# Patient Record
Sex: FEMALE | Race: OTHER | HISPANIC OR LATINO | URBAN - METROPOLITAN AREA
[De-identification: names, ages, dates, MRNs, and addresses within clinical notes are randomized per-mention and may not be internally consistent; named-entity substitution may affect disease eponyms.]

---

## 2017-10-22 ENCOUNTER — EMERGENCY (EMERGENCY)
Facility: HOSPITAL | Age: 77
LOS: 1 days | Discharge: HOME | End: 2017-10-22
Payer: MEDICARE

## 2017-10-22 VITALS
HEART RATE: 95 BPM | DIASTOLIC BLOOD PRESSURE: 90 MMHG | RESPIRATION RATE: 20 BRPM | WEIGHT: 145.06 LBS | SYSTOLIC BLOOD PRESSURE: 137 MMHG | OXYGEN SATURATION: 100 %

## 2017-10-22 VITALS
HEART RATE: 81 BPM | SYSTOLIC BLOOD PRESSURE: 142 MMHG | RESPIRATION RATE: 18 BRPM | TEMPERATURE: 98 F | DIASTOLIC BLOOD PRESSURE: 80 MMHG | OXYGEN SATURATION: 94 %

## 2017-10-22 DIAGNOSIS — R19.7 DIARRHEA, UNSPECIFIED: ICD-10-CM

## 2017-10-22 DIAGNOSIS — J40 BRONCHITIS, NOT SPECIFIED AS ACUTE OR CHRONIC: ICD-10-CM

## 2017-10-22 LAB
ALBUMIN SERPL ELPH-MCNC: 3.2 G/DL — LOW (ref 3.5–5)
ALP SERPL-CCNC: 107 U/L — SIGNIFICANT CHANGE UP (ref 40–120)
ALT FLD-CCNC: 29 U/L DA — SIGNIFICANT CHANGE UP (ref 10–60)
ANION GAP SERPL CALC-SCNC: 4 MMOL/L — LOW (ref 5–17)
APPEARANCE UR: CLEAR — SIGNIFICANT CHANGE UP
AST SERPL-CCNC: 40 U/L — SIGNIFICANT CHANGE UP (ref 10–40)
BACTERIA # UR AUTO: NEGATIVE /HPF — SIGNIFICANT CHANGE UP
BASOPHILS # BLD AUTO: 0.1 K/UL — SIGNIFICANT CHANGE UP (ref 0–0.2)
BASOPHILS NFR BLD AUTO: 2 % — SIGNIFICANT CHANGE UP (ref 0–2)
BILIRUB SERPL-MCNC: 0.3 MG/DL — SIGNIFICANT CHANGE UP (ref 0.2–1.2)
BILIRUB UR-MCNC: NEGATIVE — SIGNIFICANT CHANGE UP
BUN SERPL-MCNC: 11 MG/DL — SIGNIFICANT CHANGE UP (ref 7–18)
CALCIUM SERPL-MCNC: 8.5 MG/DL — SIGNIFICANT CHANGE UP (ref 8.4–10.5)
CHLORIDE SERPL-SCNC: 106 MMOL/L — SIGNIFICANT CHANGE UP (ref 96–108)
CO2 SERPL-SCNC: 31 MMOL/L — SIGNIFICANT CHANGE UP (ref 22–31)
COLOR SPEC: YELLOW — SIGNIFICANT CHANGE UP
COMMENT - URINE: SIGNIFICANT CHANGE UP
CREAT SERPL-MCNC: 0.58 MG/DL — SIGNIFICANT CHANGE UP (ref 0.5–1.3)
DIFF PNL FLD: ABNORMAL
EOSINOPHIL # BLD AUTO: 0 K/UL — SIGNIFICANT CHANGE UP (ref 0–0.5)
EOSINOPHIL NFR BLD AUTO: 0.9 % — SIGNIFICANT CHANGE UP (ref 0–6)
EPI CELLS # UR: SIGNIFICANT CHANGE UP /HPF
GLUCOSE SERPL-MCNC: 101 MG/DL — HIGH (ref 70–99)
GLUCOSE UR QL: NEGATIVE — SIGNIFICANT CHANGE UP
HCT VFR BLD CALC: 48.8 % — HIGH (ref 34.5–45)
HGB BLD-MCNC: 15.8 G/DL — HIGH (ref 11.5–15.5)
KETONES UR-MCNC: NEGATIVE — SIGNIFICANT CHANGE UP
LEUKOCYTE ESTERASE UR-ACNC: NEGATIVE — SIGNIFICANT CHANGE UP
LYMPHOCYTES # BLD AUTO: 1.3 K/UL — SIGNIFICANT CHANGE UP (ref 1–3.3)
LYMPHOCYTES # BLD AUTO: 41.7 % — SIGNIFICANT CHANGE UP (ref 13–44)
MCHC RBC-ENTMCNC: 29.4 PG — SIGNIFICANT CHANGE UP (ref 27–34)
MCHC RBC-ENTMCNC: 32.5 GM/DL — SIGNIFICANT CHANGE UP (ref 32–36)
MCV RBC AUTO: 90.5 FL — SIGNIFICANT CHANGE UP (ref 80–100)
MONOCYTES # BLD AUTO: 0.2 K/UL — SIGNIFICANT CHANGE UP (ref 0–0.9)
MONOCYTES NFR BLD AUTO: 6.5 % — SIGNIFICANT CHANGE UP (ref 2–14)
NEUTROPHILS # BLD AUTO: 1.6 K/UL — LOW (ref 1.8–7.4)
NEUTROPHILS NFR BLD AUTO: 48.9 % — SIGNIFICANT CHANGE UP (ref 43–77)
NITRITE UR-MCNC: NEGATIVE — SIGNIFICANT CHANGE UP
PH UR: 6 — SIGNIFICANT CHANGE UP (ref 5–8)
PLATELET # BLD AUTO: 179 K/UL — SIGNIFICANT CHANGE UP (ref 150–400)
POTASSIUM SERPL-MCNC: 4.8 MMOL/L — SIGNIFICANT CHANGE UP (ref 3.5–5.3)
POTASSIUM SERPL-SCNC: 4.8 MMOL/L — SIGNIFICANT CHANGE UP (ref 3.5–5.3)
PROT SERPL-MCNC: 7.2 G/DL — SIGNIFICANT CHANGE UP (ref 6–8.3)
PROT UR-MCNC: NEGATIVE — SIGNIFICANT CHANGE UP
RBC # BLD: 5.39 M/UL — HIGH (ref 3.8–5.2)
RBC # FLD: 12.8 % — SIGNIFICANT CHANGE UP (ref 10.3–14.5)
RBC CASTS # UR COMP ASSIST: ABNORMAL /HPF (ref 0–2)
SODIUM SERPL-SCNC: 141 MMOL/L — SIGNIFICANT CHANGE UP (ref 135–145)
SP GR SPEC: 1.01 — SIGNIFICANT CHANGE UP (ref 1.01–1.02)
UROBILINOGEN FLD QL: NEGATIVE — SIGNIFICANT CHANGE UP
WBC # BLD: 3.2 K/UL — LOW (ref 3.8–10.5)
WBC # FLD AUTO: 3.2 K/UL — LOW (ref 3.8–10.5)
WBC UR QL: SIGNIFICANT CHANGE UP /HPF (ref 0–5)

## 2017-10-22 PROCEDURE — 71046 X-RAY EXAM CHEST 2 VIEWS: CPT

## 2017-10-22 PROCEDURE — 81001 URINALYSIS AUTO W/SCOPE: CPT

## 2017-10-22 PROCEDURE — 85027 COMPLETE CBC AUTOMATED: CPT

## 2017-10-22 PROCEDURE — 99283 EMERGENCY DEPT VISIT LOW MDM: CPT | Mod: 25

## 2017-10-22 PROCEDURE — 99284 EMERGENCY DEPT VISIT MOD MDM: CPT

## 2017-10-22 PROCEDURE — 36000 PLACE NEEDLE IN VEIN: CPT

## 2017-10-22 PROCEDURE — 71020: CPT | Mod: 26

## 2017-10-22 PROCEDURE — 80053 COMPREHEN METABOLIC PANEL: CPT

## 2017-10-22 RX ORDER — ACETAMINOPHEN 500 MG
650 TABLET ORAL ONCE
Qty: 0 | Refills: 0 | Status: COMPLETED | OUTPATIENT
Start: 2017-10-22 | End: 2017-10-22

## 2017-10-22 RX ORDER — IBUPROFEN 200 MG
400 TABLET ORAL ONCE
Qty: 0 | Refills: 0 | Status: COMPLETED | OUTPATIENT
Start: 2017-10-22 | End: 2017-10-22

## 2017-10-22 RX ADMIN — Medication 650 MILLIGRAM(S): at 17:16

## 2017-10-22 RX ADMIN — Medication 400 MILLIGRAM(S): at 17:16

## 2017-10-22 RX ADMIN — Medication 400 MILLIGRAM(S): at 16:46

## 2017-10-22 RX ADMIN — Medication 650 MILLIGRAM(S): at 16:46

## 2017-10-22 NOTE — ED PROVIDER NOTE - OBJECTIVE STATEMENT
78 y/o F pt with PMHx of bronchitis and gastritis presents to ED c/o subjective fever, chills, body aches, cough (nonproductive), and diarrhea x 3 days. Pt denies any recent travel, sick contacts, nausea, vomiting, abd pain, or any other complaints. NKDA.

## 2021-06-17 ENCOUNTER — EMERGENCY (EMERGENCY)
Facility: HOSPITAL | Age: 81
LOS: 1 days | Discharge: HOME | End: 2021-06-17
Attending: EMERGENCY MEDICINE
Payer: COMMERCIAL

## 2021-06-17 VITALS
DIASTOLIC BLOOD PRESSURE: 95 MMHG | SYSTOLIC BLOOD PRESSURE: 163 MMHG | HEART RATE: 94 BPM | HEIGHT: 62 IN | RESPIRATION RATE: 16 BRPM | OXYGEN SATURATION: 98 % | WEIGHT: 149.91 LBS | TEMPERATURE: 98 F

## 2021-06-17 PROBLEM — J40 BRONCHITIS, NOT SPECIFIED AS ACUTE OR CHRONIC: Chronic | Status: ACTIVE | Noted: 2017-10-22

## 2021-06-17 PROCEDURE — 73610 X-RAY EXAM OF ANKLE: CPT

## 2021-06-17 PROCEDURE — 99283 EMERGENCY DEPT VISIT LOW MDM: CPT

## 2021-06-17 PROCEDURE — 99283 EMERGENCY DEPT VISIT LOW MDM: CPT | Mod: 25

## 2021-06-17 PROCEDURE — 73610 X-RAY EXAM OF ANKLE: CPT | Mod: 26,LT

## 2021-06-17 NOTE — ED PROVIDER NOTE - PATIENT PORTAL LINK FT
You can access the FollowMyHealth Patient Portal offered by [facility] by registering at the following website: [email or web address]. By joining [facility]’s FollowMyHealth portal, you will also be able to view your health information using other applications (apps) compatible with our system.

## 2021-06-17 NOTE — ED PROVIDER NOTE - OBJECTIVE STATEMENT
80 y.o female with no PMhx and no PSHx presents to the ED c.o L ankle being twisted today while walking, pain is x6-7.10  , swollen at the lateral aspect of the L ankle. Patient has difficulty ambulating. Patient denies any other acute complaints. NKDA

## 2021-06-17 NOTE — ED PROVIDER NOTE - PHYSICAL EXAMINATION
No deformity, visible swelling on lateral aspect of L ankle, with tenderness to palpation  full range of motion on flexion and extension

## 2021-06-17 NOTE — ED PROVIDER NOTE - CLINICAL SUMMARY MEDICAL DECISION MAKING FREE TEXT BOX
Patient with ankle sprain vs fracture, will get x ray and reevaluate Patient with ankle sprain vs fracture, will get x ray and reevaluate. no fracture

## 2023-09-13 ENCOUNTER — EMERGENCY (EMERGENCY)
Facility: HOSPITAL | Age: 83
LOS: 1 days | Discharge: HOME | End: 2023-09-13
Attending: STUDENT IN AN ORGANIZED HEALTH CARE EDUCATION/TRAINING PROGRAM
Payer: COMMERCIAL

## 2023-09-13 VITALS
HEART RATE: 110 BPM | SYSTOLIC BLOOD PRESSURE: 130 MMHG | HEIGHT: 61 IN | TEMPERATURE: 99 F | OXYGEN SATURATION: 98 % | WEIGHT: 143.3 LBS | RESPIRATION RATE: 22 BRPM | DIASTOLIC BLOOD PRESSURE: 78 MMHG

## 2023-09-13 VITALS
OXYGEN SATURATION: 100 % | HEART RATE: 96 BPM | TEMPERATURE: 99 F | RESPIRATION RATE: 20 BRPM | DIASTOLIC BLOOD PRESSURE: 77 MMHG | SYSTOLIC BLOOD PRESSURE: 143 MMHG

## 2023-09-13 LAB
ALBUMIN SERPL ELPH-MCNC: 3.1 G/DL — LOW (ref 3.5–5)
ALP SERPL-CCNC: 86 U/L — SIGNIFICANT CHANGE UP (ref 40–120)
ALT FLD-CCNC: 19 U/L DA — SIGNIFICANT CHANGE UP (ref 10–60)
ANION GAP SERPL CALC-SCNC: 4 MMOL/L — LOW (ref 5–17)
APTT BLD: 25.6 SEC — SIGNIFICANT CHANGE UP (ref 24.5–35.6)
AST SERPL-CCNC: 15 U/L — SIGNIFICANT CHANGE UP (ref 10–40)
BASOPHILS # BLD AUTO: 0.09 K/UL — SIGNIFICANT CHANGE UP (ref 0–0.2)
BASOPHILS NFR BLD AUTO: 0.8 % — SIGNIFICANT CHANGE UP (ref 0–2)
BILIRUB SERPL-MCNC: 0.3 MG/DL — SIGNIFICANT CHANGE UP (ref 0.2–1.2)
BLD GP AB SCN SERPL QL: SIGNIFICANT CHANGE UP
BUN SERPL-MCNC: 26 MG/DL — HIGH (ref 7–18)
CALCIUM SERPL-MCNC: 8.9 MG/DL — SIGNIFICANT CHANGE UP (ref 8.4–10.5)
CHLORIDE SERPL-SCNC: 106 MMOL/L — SIGNIFICANT CHANGE UP (ref 96–108)
CO2 SERPL-SCNC: 28 MMOL/L — SIGNIFICANT CHANGE UP (ref 22–31)
CREAT SERPL-MCNC: 0.75 MG/DL — SIGNIFICANT CHANGE UP (ref 0.5–1.3)
EGFR: 80 ML/MIN/1.73M2 — SIGNIFICANT CHANGE UP
EOSINOPHIL # BLD AUTO: 0.11 K/UL — SIGNIFICANT CHANGE UP (ref 0–0.5)
EOSINOPHIL NFR BLD AUTO: 1 % — SIGNIFICANT CHANGE UP (ref 0–6)
GLUCOSE SERPL-MCNC: 121 MG/DL — HIGH (ref 70–99)
HCT VFR BLD CALC: 34.3 % — LOW (ref 34.5–45)
HGB BLD-MCNC: 11.5 G/DL — SIGNIFICANT CHANGE UP (ref 11.5–15.5)
IMM GRANULOCYTES NFR BLD AUTO: 1.1 % — HIGH (ref 0–0.9)
INR BLD: 0.95 RATIO — SIGNIFICANT CHANGE UP (ref 0.85–1.18)
LACTATE SERPL-SCNC: 1.9 MMOL/L — SIGNIFICANT CHANGE UP (ref 0.7–2)
LYMPHOCYTES # BLD AUTO: 2.76 K/UL — SIGNIFICANT CHANGE UP (ref 1–3.3)
LYMPHOCYTES # BLD AUTO: 25.9 % — SIGNIFICANT CHANGE UP (ref 13–44)
MCHC RBC-ENTMCNC: 29.3 PG — SIGNIFICANT CHANGE UP (ref 27–34)
MCHC RBC-ENTMCNC: 33.5 GM/DL — SIGNIFICANT CHANGE UP (ref 32–36)
MCV RBC AUTO: 87.5 FL — SIGNIFICANT CHANGE UP (ref 80–100)
MONOCYTES # BLD AUTO: 0.77 K/UL — SIGNIFICANT CHANGE UP (ref 0–0.9)
MONOCYTES NFR BLD AUTO: 7.2 % — SIGNIFICANT CHANGE UP (ref 2–14)
NEUTROPHILS # BLD AUTO: 6.81 K/UL — SIGNIFICANT CHANGE UP (ref 1.8–7.4)
NEUTROPHILS NFR BLD AUTO: 64 % — SIGNIFICANT CHANGE UP (ref 43–77)
NRBC # BLD: 0 /100 WBCS — SIGNIFICANT CHANGE UP (ref 0–0)
PLATELET # BLD AUTO: 264 K/UL — SIGNIFICANT CHANGE UP (ref 150–400)
POTASSIUM SERPL-MCNC: 3.5 MMOL/L — SIGNIFICANT CHANGE UP (ref 3.5–5.3)
POTASSIUM SERPL-SCNC: 3.5 MMOL/L — SIGNIFICANT CHANGE UP (ref 3.5–5.3)
PROT SERPL-MCNC: 6.6 G/DL — SIGNIFICANT CHANGE UP (ref 6–8.3)
PROTHROM AB SERPL-ACNC: 10.8 SEC — SIGNIFICANT CHANGE UP (ref 9.5–13)
RBC # BLD: 3.92 M/UL — SIGNIFICANT CHANGE UP (ref 3.8–5.2)
RBC # FLD: 13 % — SIGNIFICANT CHANGE UP (ref 10.3–14.5)
SODIUM SERPL-SCNC: 138 MMOL/L — SIGNIFICANT CHANGE UP (ref 135–145)
WBC # BLD: 10.66 K/UL — HIGH (ref 3.8–10.5)
WBC # FLD AUTO: 10.66 K/UL — HIGH (ref 3.8–10.5)

## 2023-09-13 PROCEDURE — 74174 CTA ABD&PLVS W/CONTRAST: CPT | Mod: MA

## 2023-09-13 PROCEDURE — 36415 COLL VENOUS BLD VENIPUNCTURE: CPT

## 2023-09-13 PROCEDURE — 99285 EMERGENCY DEPT VISIT HI MDM: CPT

## 2023-09-13 PROCEDURE — 86850 RBC ANTIBODY SCREEN: CPT

## 2023-09-13 PROCEDURE — 74174 CTA ABD&PLVS W/CONTRAST: CPT | Mod: 26,MA

## 2023-09-13 PROCEDURE — 99285 EMERGENCY DEPT VISIT HI MDM: CPT | Mod: 25

## 2023-09-13 PROCEDURE — 85730 THROMBOPLASTIN TIME PARTIAL: CPT

## 2023-09-13 PROCEDURE — 86901 BLOOD TYPING SEROLOGIC RH(D): CPT

## 2023-09-13 PROCEDURE — 93005 ELECTROCARDIOGRAM TRACING: CPT

## 2023-09-13 PROCEDURE — 80053 COMPREHEN METABOLIC PANEL: CPT

## 2023-09-13 PROCEDURE — 85610 PROTHROMBIN TIME: CPT

## 2023-09-13 PROCEDURE — 86900 BLOOD TYPING SEROLOGIC ABO: CPT

## 2023-09-13 PROCEDURE — 85025 COMPLETE CBC W/AUTO DIFF WBC: CPT

## 2023-09-13 PROCEDURE — 83605 ASSAY OF LACTIC ACID: CPT

## 2023-09-13 NOTE — ED PROVIDER NOTE - OBJECTIVE STATEMENT
#[id number]    83 year old female with PMH hemorrhoids s/p hemorrhoidectomy (~3 years ago per pt), HTN, hypothyroidism presents with bloody stools x 3 days. Patient reports formed black stools over the past 3 days with bright red blood associated with rectal pain, approximately 4-5 episodes per day. Denies any fevers, chest pain, shortness of breath, abdominal pain, vomiting, diarrhea, dysuria, headache, vision change, numbness, weakness, or rash. Denies any aspirin or AC use. Denies any additional complaints.

## 2023-09-13 NOTE — ED PROVIDER NOTE - NSFOLLOWUPINSTRUCTIONS_ED_ALL_ED_FT
Hemorragia digestiva baja  Lower Gastrointestinal Bleeding       La hemorragia digestiva baja es consecuencia de caron hemorragia en el colon, el recto o la mariaa de la abertura de las nalgas (mariaa anal). El colon es la última parte del tubo digestivo, donde se forma las heces (materia fecal). Caron persona con hemorragia digestiva baja puede christi margaret en las heces. La margaret puede ser de color santos brillante.    La hemorragia digestiva baja suele detenerse sin tratamiento. La hemorragia continua o profusa puede poner en riesgo la leandro y necesita tratamiento de emergencia en un hospital.    ¿Cuáles son las causas?  Esta afección puede ser causada por lo siguiente:    Diverticulosis. Esta afección hace que se formen bolsas en el colon con el transcurso del tiempo.  Diverticulitis. Es caron inflamación de las zonas donde hay diverticulosis.  Enfermedad inflamatoria del intestino (EII) o inflamación del colon.  Hemorroides, o venas hinchadas en el recto.  Fisuras anales o desgarros dolorosos en el ano. Los desgarros generalmente se producen cuando se evacúan heces duras.  Cáncer de colon o recto, o pólipos en el colon o el recto. Los pólipos son crecimientos no cancerosos.  Coagulopatía. Taylor es un trastorno que dificulta la formación de coágulos de margaret y causa fácil sangrado.  Malformación arteriovenosa. Taylor es un debilitamiento anormal de un vaso sanguíneo donde caron arteria y caron vena se unen.    ¿Qué incrementa el riesgo?  Los siguientes factores pueden hacer que sea más propenso a desarrollar esta afección:    Ser mayor de 60 años de edad.  Consumo frecuente de aspirina o antiinflamatorios no esteroideos (ANNA), lety el ibuprofeno.  Iván medicamentos que diluyen la margaret (anticoagulantes) o antiagregantes plaquetarios.  Tener antecedentes de tratamientos con hima X de jv dosis (radioterapia) en el colon.  Haberse sometido a la extracción de un pólipo del colon recientemente.  Consumir alcohol excesivamente o usar productos con nicotina.    ¿Cuáles son los signos o síntomas?  Los síntomas de esta afección incluyen:    Margaret de color santos brillante o coágulos sanguíneos procedentes del recto.  Materia fecal con margaret.  Materia fecal de color dana o granate.  Dolor o cólicos en el abdomen.  Debilidad o mareos.  Latidos cardíacos acelerados.    ¿Cómo se diagnostica?  Esta afección se puede diagnosticar en función de lo siguiente:    Los síntomas y los antecedentes médicos.  Un examen físico. Mindy el examen, el médico lo revisará para detectar signos de pérdida de margaret, lety descenso de la presión arterial y pulso rápido.  Estudios o procedimientos. Estos incluyen:    Sigmoidoscopia flexible. En taylor procedimiento, se utiliza un tubo flexible con caron cámara en un extremo para examinar el ano y la primera parte del colon a fin de detectar el origen de la hemorragia.  Colonoscopía. Taylor procedimiento es similar a caron sigmoidoscopia flexible, shaw la cámara se puede llegar hasta la parte superior del colon.  Análisis de margaret para obtener el conteo de glóbulos rojos y comprobar si existe caron coagulopatía.  Angiograma. Para esta prueba, se marko radiografías del colon después de inyectar un tinte radioactivo o de contraste en el torrente sanguíneo. Taylor puede hacerse para detectar la presencia de un lugar de hemorragia.    ¿Cómo se trata?  El tratamiento de esta afección depende de la causa de la hemorragia. Caron hemorragia profusa o continua se trata en un hospital. El tratamiento puede incluir:    Recibir líquidos por caron vía intravenosa que se coloca en caron de las venas.  Recibir margaret por caron vía intravenosa (transfusión de margaret).  Detener la hemorragia mediante la aplicación de temperatura elevada (coagulación), inyecciones de ciertos medicamentos o grapas quirúrgicas. Todo esto se puede realizar mindy caron colonoscopia.  Someterse a un procedimiento que incluye la realización de un angiograma jeffry y luego caron obstrucción del flujo sanguíneo en el lugar del sangrado (embolización).  Dejar de iván algunos de sarah medicamentos que rosmery habitualmente mindy cierto tiempo.  Someterse a caron cirugía para extirpar parte del colon. [place] puede ser necesario si la hemorragia es intensa y no disminuye con otro tratamiento.    Siga estas instrucciones en lópez casa:      Comida y bebida     Come alimentos ricos en fibra. [place] lo ayudará a mantener las heces blandas. Estos alimentos incluyen granos enteros, legumbres, frutas y verduras. La ingesta de 1 a 3 ciruelas pasas por día tiene un efecto positivo en muchas personas.  Janice suficiente líquido lety para mantener la orina de color amarillo pálido.  No janice alcohol.        Instrucciones generales     No consuma ningún producto que contenga nicotina o tabaco, lety cigarrillos, cigarrillos electrónicos y tabaco de mascar. Si necesita ayuda para dejar de fumar, consulte al médico.  Use los medicamentos de venta evelia y los recetados solamente lety se lo haya indicado el médico. Es posible que deba evitar la aspirina, los antiinflamatorios no esteroideos (ANNA) u otros medicamentos que aumentan la hemorragia.  Retome sarah actividades normales según lo indicado por el médico. Pregúntele al médico qué actividades son seguras para usted.  Concurra a todas las visitas de seguimiento lety se lo haya indicado el médico. [place] es importante.    Comuníquese con un médico si:  Sarah síntomas no mejoran.  Siente náuseas o vomita.  Tiene dolor en el abdomen.  Se siente débil o mareado.  Necesita ayuda para dejar de fumar o de beber alcohol.    Solicite ayuda de inmediato si:  La hemorragia aumenta.  Se siente muy débil o se desmaya.  Tiene cólicos intensos o repentinos en la espalda o el abdomen.  Vomita margaret.  Elimina grandes coágulos de margaret en las heces.  Cualquier de los otros síntomas empeora.    Estos síntomas pueden representar un problema grave que constituye caron emergencia. No espere a christi si los síntomas desaparecen. Solicite atención médica de inmediato. Comuníquese con el servicio de emergencias de lópez localidad (911 en los [place]). No conduzca por sarah propios medios hasta el hospital.    Resumen  Si tiene hemorragia digestiva baja, puede christi margaret en las heces (materia fecal). La margaret puede ser de color santos brillante.  Use los medicamentos de venta evelia y los recetados solamente lety se lo haya indicado el médico. Es posible que deba evitar la aspirina, los antiinflamatorios no esteroideos (ANNA) u otros medicamentos que aumentan la hemorragia.  Concurra a todas las visitas de seguimiento lety se lo haya indicado el médico. [place] es importante.  Solicite ayuda de inmediato si los síntomas empeoran.

## 2023-09-13 NOTE — ED PROVIDER NOTE - PROGRESS NOTE DETAILS
Von: pt seen and re-evaluated at bedside.  Pt states their symptoms have improved.  Pt comfortable in NAD. CT showing hiatal hernia, pt states she is aware. Vital signs non-actionable. No anemia on CBC. No further episodes of BRBPR. Patient declining rectal exam/external exam. Will DC with GI follow up, strict return precautions discussed, pt understood and agreeable with plan.

## 2023-09-13 NOTE — ED PROVIDER NOTE - NSFOLLOWUPCLINICS_GEN_ALL_ED_FT
[facility]  Gastroenterology  [address] [place] [address]  Phone: [phone]  Fax: [phone]

## 2023-09-13 NOTE — ED PROVIDER NOTE - CLINICAL SUMMARY MEDICAL DECISION MAKING FREE TEXT BOX
Von: 83 year old female with PMH hemorrhoids s/p hemorrhoidectomy (~3 years ago per pt), HTN, hypothyroidism presents with bloody stools x 3 days. Patient reports formed black stools over the past 3 days with bright red blood associated with rectal pain, approximately 4-5 episodes per day. Denies any fevers, chest pain, shortness of breath, abdominal pain, vomiting, diarrhea, dysuria, headache, vision change, numbness, weakness, or rash. Denies any aspirin or AC use. Physical exam per above. Patient mildly tachycardic on arrival, no CP, SOB, dizziness, or other signs/symptoms of anemia. Will obtain labs, imaging, provide supportive treatment with dispo pending workup.

## 2023-09-13 NOTE — ED ADULT TRIAGE NOTE - HEIGHT IN CM
oral hygiene/small sips/bites/allow for swallow between intakes/maintain upright posture during/after eating for 30 mins/alternate food with liquid/crush medication (when feasible)/hard swallow w/ each bite or sip/position upright (90 degrees) 154.94

## 2023-09-13 NOTE — ED PROVIDER NOTE - PATIENT PORTAL LINK FT
You can access the FollowMyHealth Patient Portal offered by [facility] by registering at the following website: [email or web address]. By joining [employer]’s FollowMyHealth portal, you will also be able to view your health information using other applications (apps) compatible with our system.

## 2024-04-27 NOTE — ED ADULT TRIAGE NOTE - MEANS OF ARRIVAL
[[id number]],[[id number]],[[id number]] ambulatory [[id number]],[[id number]],[[id number]],[[id number]]

## 2024-08-05 NOTE — ED ADULT NURSE NOTE - EENT WDL
Patient is requesting med refill   
Eyes with no visual disturbances.  Ears clean and dry and no hearing difficulties. Nose with pink mucosa and no drainage.  Mouth mucous membranes moist and pink.  No tenderness or swelling to throat or neck.

## 2024-09-22 NOTE — ED PROVIDER NOTE - EXITCARE/DISCHARGE INSTRUCTIONS
Launch [employer] and print the 'Prescriptions from this Visit' Report
4 = No assist / stand by assistance
